# Patient Record
Sex: MALE | Race: WHITE | NOT HISPANIC OR LATINO | URBAN - METROPOLITAN AREA
[De-identification: names, ages, dates, MRNs, and addresses within clinical notes are randomized per-mention and may not be internally consistent; named-entity substitution may affect disease eponyms.]

---

## 2024-02-16 ENCOUNTER — EMERGENCY (EMERGENCY)
Facility: HOSPITAL | Age: 29
LOS: 1 days | Discharge: HOME | End: 2024-02-16
Attending: EMERGENCY MEDICINE | Admitting: EMERGENCY MEDICINE
Payer: COMMERCIAL

## 2024-02-16 VITALS
SYSTOLIC BLOOD PRESSURE: 162 MMHG | HEART RATE: 78 BPM | OXYGEN SATURATION: 100 % | DIASTOLIC BLOOD PRESSURE: 103 MMHG | RESPIRATION RATE: 18 BRPM | TEMPERATURE: 98 F

## 2024-02-16 VITALS
OXYGEN SATURATION: 99 % | TEMPERATURE: 98 F | SYSTOLIC BLOOD PRESSURE: 102 MMHG | DIASTOLIC BLOOD PRESSURE: 65 MMHG | RESPIRATION RATE: 18 BRPM | HEART RATE: 70 BPM

## 2024-02-16 DIAGNOSIS — R10.9 UNSPECIFIED ABDOMINAL PAIN: ICD-10-CM

## 2024-02-16 DIAGNOSIS — R39.15 URGENCY OF URINATION: ICD-10-CM

## 2024-02-16 DIAGNOSIS — R14.0 ABDOMINAL DISTENSION (GASEOUS): ICD-10-CM

## 2024-02-16 DIAGNOSIS — R11.0 NAUSEA: ICD-10-CM

## 2024-02-16 DIAGNOSIS — R35.0 FREQUENCY OF MICTURITION: ICD-10-CM

## 2024-02-16 LAB
ALBUMIN SERPL ELPH-MCNC: 4.7 G/DL — SIGNIFICANT CHANGE UP (ref 3.3–5)
ALP SERPL-CCNC: 63 U/L — SIGNIFICANT CHANGE UP (ref 40–120)
ALT FLD-CCNC: 55 U/L — HIGH (ref 10–45)
ANION GAP SERPL CALC-SCNC: 9 MMOL/L — SIGNIFICANT CHANGE UP (ref 5–17)
APPEARANCE UR: CLEAR — SIGNIFICANT CHANGE UP
AST SERPL-CCNC: 51 U/L — HIGH (ref 10–40)
BASOPHILS # BLD AUTO: 0.04 K/UL — SIGNIFICANT CHANGE UP (ref 0–0.2)
BASOPHILS NFR BLD AUTO: 0.5 % — SIGNIFICANT CHANGE UP (ref 0–2)
BILIRUB SERPL-MCNC: 0.4 MG/DL — SIGNIFICANT CHANGE UP (ref 0.2–1.2)
BILIRUB UR-MCNC: NEGATIVE — SIGNIFICANT CHANGE UP
BUN SERPL-MCNC: 17 MG/DL — SIGNIFICANT CHANGE UP (ref 7–23)
CALCIUM SERPL-MCNC: 9.9 MG/DL — SIGNIFICANT CHANGE UP (ref 8.4–10.5)
CHLORIDE SERPL-SCNC: 103 MMOL/L — SIGNIFICANT CHANGE UP (ref 96–108)
CO2 SERPL-SCNC: 25 MMOL/L — SIGNIFICANT CHANGE UP (ref 22–31)
COLOR SPEC: YELLOW — SIGNIFICANT CHANGE UP
CREAT SERPL-MCNC: 0.94 MG/DL — SIGNIFICANT CHANGE UP (ref 0.5–1.3)
DIFF PNL FLD: NEGATIVE — SIGNIFICANT CHANGE UP
EGFR: 115 ML/MIN/1.73M2 — SIGNIFICANT CHANGE UP
EOSINOPHIL # BLD AUTO: 0.09 K/UL — SIGNIFICANT CHANGE UP (ref 0–0.5)
EOSINOPHIL NFR BLD AUTO: 1.2 % — SIGNIFICANT CHANGE UP (ref 0–6)
GLUCOSE SERPL-MCNC: 110 MG/DL — HIGH (ref 70–99)
GLUCOSE UR QL: NEGATIVE MG/DL — SIGNIFICANT CHANGE UP
HCT VFR BLD CALC: 44.5 % — SIGNIFICANT CHANGE UP (ref 39–50)
HGB BLD-MCNC: 15.2 G/DL — SIGNIFICANT CHANGE UP (ref 13–17)
IMM GRANULOCYTES NFR BLD AUTO: 0.3 % — SIGNIFICANT CHANGE UP (ref 0–0.9)
KETONES UR-MCNC: NEGATIVE MG/DL — SIGNIFICANT CHANGE UP
LEUKOCYTE ESTERASE UR-ACNC: NEGATIVE — SIGNIFICANT CHANGE UP
LIDOCAIN IGE QN: 54 U/L — SIGNIFICANT CHANGE UP (ref 7–60)
LYMPHOCYTES # BLD AUTO: 2.67 K/UL — SIGNIFICANT CHANGE UP (ref 1–3.3)
LYMPHOCYTES # BLD AUTO: 35.5 % — SIGNIFICANT CHANGE UP (ref 13–44)
MCHC RBC-ENTMCNC: 30.3 PG — SIGNIFICANT CHANGE UP (ref 27–34)
MCHC RBC-ENTMCNC: 34.2 GM/DL — SIGNIFICANT CHANGE UP (ref 32–36)
MCV RBC AUTO: 88.6 FL — SIGNIFICANT CHANGE UP (ref 80–100)
MONOCYTES # BLD AUTO: 0.83 K/UL — SIGNIFICANT CHANGE UP (ref 0–0.9)
MONOCYTES NFR BLD AUTO: 11 % — SIGNIFICANT CHANGE UP (ref 2–14)
NEUTROPHILS # BLD AUTO: 3.88 K/UL — SIGNIFICANT CHANGE UP (ref 1.8–7.4)
NEUTROPHILS NFR BLD AUTO: 51.5 % — SIGNIFICANT CHANGE UP (ref 43–77)
NITRITE UR-MCNC: NEGATIVE — SIGNIFICANT CHANGE UP
NRBC # BLD: 0 /100 WBCS — SIGNIFICANT CHANGE UP (ref 0–0)
PH UR: 6.5 — SIGNIFICANT CHANGE UP (ref 5–8)
PLATELET # BLD AUTO: 230 K/UL — SIGNIFICANT CHANGE UP (ref 150–400)
POTASSIUM SERPL-MCNC: 3.8 MMOL/L — SIGNIFICANT CHANGE UP (ref 3.5–5.3)
POTASSIUM SERPL-SCNC: 3.8 MMOL/L — SIGNIFICANT CHANGE UP (ref 3.5–5.3)
PROT SERPL-MCNC: 7.2 G/DL — SIGNIFICANT CHANGE UP (ref 6–8.3)
PROT UR-MCNC: NEGATIVE MG/DL — SIGNIFICANT CHANGE UP
RBC # BLD: 5.02 M/UL — SIGNIFICANT CHANGE UP (ref 4.2–5.8)
RBC # FLD: 12.5 % — SIGNIFICANT CHANGE UP (ref 10.3–14.5)
SODIUM SERPL-SCNC: 137 MMOL/L — SIGNIFICANT CHANGE UP (ref 135–145)
SP GR SPEC: 1.03 — SIGNIFICANT CHANGE UP (ref 1–1.03)
UROBILINOGEN FLD QL: 1 MG/DL — SIGNIFICANT CHANGE UP (ref 0.2–1)
WBC # BLD: 7.53 K/UL — SIGNIFICANT CHANGE UP (ref 3.8–10.5)
WBC # FLD AUTO: 7.53 K/UL — SIGNIFICANT CHANGE UP (ref 3.8–10.5)

## 2024-02-16 PROCEDURE — 85025 COMPLETE CBC W/AUTO DIFF WBC: CPT

## 2024-02-16 PROCEDURE — 74176 CT ABD & PELVIS W/O CONTRAST: CPT | Mod: 26,MG

## 2024-02-16 PROCEDURE — G1004: CPT

## 2024-02-16 PROCEDURE — 96375 TX/PRO/DX INJ NEW DRUG ADDON: CPT

## 2024-02-16 PROCEDURE — 83690 ASSAY OF LIPASE: CPT

## 2024-02-16 PROCEDURE — 99284 EMERGENCY DEPT VISIT MOD MDM: CPT | Mod: 25

## 2024-02-16 PROCEDURE — 99284 EMERGENCY DEPT VISIT MOD MDM: CPT

## 2024-02-16 PROCEDURE — 36415 COLL VENOUS BLD VENIPUNCTURE: CPT

## 2024-02-16 PROCEDURE — 96374 THER/PROPH/DIAG INJ IV PUSH: CPT

## 2024-02-16 PROCEDURE — 74176 CT ABD & PELVIS W/O CONTRAST: CPT | Mod: MG

## 2024-02-16 PROCEDURE — 81003 URINALYSIS AUTO W/O SCOPE: CPT

## 2024-02-16 PROCEDURE — 76705 ECHO EXAM OF ABDOMEN: CPT | Mod: 26

## 2024-02-16 PROCEDURE — 80053 COMPREHEN METABOLIC PANEL: CPT

## 2024-02-16 PROCEDURE — 76705 ECHO EXAM OF ABDOMEN: CPT

## 2024-02-16 RX ORDER — ACETAMINOPHEN 500 MG
1000 TABLET ORAL ONCE
Refills: 0 | Status: COMPLETED | OUTPATIENT
Start: 2024-02-16 | End: 2024-02-16

## 2024-02-16 RX ORDER — ONDANSETRON 8 MG/1
4 TABLET, FILM COATED ORAL ONCE
Refills: 0 | Status: COMPLETED | OUTPATIENT
Start: 2024-02-16 | End: 2024-02-16

## 2024-02-16 RX ORDER — FAMOTIDINE 10 MG/ML
20 INJECTION INTRAVENOUS ONCE
Refills: 0 | Status: COMPLETED | OUTPATIENT
Start: 2024-02-16 | End: 2024-02-16

## 2024-02-16 RX ORDER — SODIUM CHLORIDE 9 MG/ML
1000 INJECTION INTRAMUSCULAR; INTRAVENOUS; SUBCUTANEOUS ONCE
Refills: 0 | Status: COMPLETED | OUTPATIENT
Start: 2024-02-16 | End: 2024-02-16

## 2024-02-16 RX ADMIN — ONDANSETRON 4 MILLIGRAM(S): 8 TABLET, FILM COATED ORAL at 03:53

## 2024-02-16 RX ADMIN — Medication 400 MILLIGRAM(S): at 03:53

## 2024-02-16 RX ADMIN — FAMOTIDINE 20 MILLIGRAM(S): 10 INJECTION INTRAVENOUS at 03:54

## 2024-02-16 RX ADMIN — SODIUM CHLORIDE 1000 MILLILITER(S): 9 INJECTION INTRAMUSCULAR; INTRAVENOUS; SUBCUTANEOUS at 03:53

## 2024-02-16 NOTE — ED PROVIDER NOTE - CARE PROVIDERS DIRECT ADDRESSES
[email or web address]

## 2024-02-16 NOTE — ED PROVIDER NOTE - PHYSICAL EXAMINATION
Constitutional: awake and alert, in no acute distress  HEENT: head normocephalic and atraumatic. moist mucous membranes  Eyes: extraocular movements intact, normal conjunctiva  Neck: supple, normal ROM  Cardiovascular: regular rate   Pulmonary: no respiratory distress  Gastrointestinal: abdomen soft, nondistended, diffuse tenderness to lower abdomen and b/l flank, no g/r  Skin: warm, dry, normal for ethnicity  Musculoskeletal: no edema, no deformity, NROM  Neurological: oriented x4, no focal neurologic deficit.   Psychiatric: calm and cooperative, no SI/HI

## 2024-02-16 NOTE — ED PROVIDER NOTE - OBJECTIVE STATEMENT
27 yo male w/o known medical h/o present to Er c/o sudden onset of intense abdominal pain that started tonight. Pt reports pain is associated with abdominal bloating and nausea and radiaites to his flank area. Pt also mentioned he has been urinating more frequently tonight and noted that only small amount of urine is coming out. Pt denies any hematuria, denies h/o kidney stones in the past, denies penile discharge, irritations, unprotected intercourse, testicular pain.

## 2024-02-16 NOTE — ED PROVIDER NOTE - ATTENDING APP SHARED VISIT CONTRIBUTION OF CARE
28M no PMH c/o abd pain tonight. pt states started suddenly, states felt nauseated. initially around umbilicus but then had b/l flank pain. no diarrhea. no fever, no vomiting. no recent travel. no sick contacts.   gen- nad  heent- ncat  abd - soft, nt, nd  ext -wwp  neuro -aox3, steady gait, shirley  no guarding, no rebound, CT concern for possible cholecystitis. pending US. pt feeling better after ivf, tylneol, pepcid, zofran.

## 2024-02-16 NOTE — ED PROVIDER NOTE - NSFOLLOWUPINSTRUCTIONS_ED_ALL_ED_FT
Thank you for visiting [facility] Emergency Department.      Please know that no emergency visit is complete without follow-up with your primary care provider in 1 week.  General surgery consult and re-evaluation recommended regarding your CT scan findings. Please bring copies of all discharge papers and results and show to your doctor.      I appreciated your patience and hope you feel better soon.   Return to ER immediately if you develop fevers, chills, chest pain, shortness of breath, worsening dizziness, and/or any concerning symptoms.    Abdominal Pain, Adult  Pain in the abdomen (abdominal pain) can be caused by many things. Often, abdominal pain is not serious and it gets better with no treatment or by being treated at home. However, sometimes abdominal pain is serious.    Your health care provider will ask questions about your medical history and do a physical exam to try to determine the cause of your abdominal pain.    Follow these instructions at home:  Medicines    Take over-the-counter and prescription medicines only as told by your health care provider.  Do not take a laxative unless told by your health care provider.  General instructions      Watch your condition for any changes.  Drink enough fluid to keep your urine pale yellow.  Keep all follow-up visits as told by your health care provider. This is important.  Contact a health care provider if:  Your abdominal pain changes or gets worse.  You are not hungry or you lose weight without trying.  You are constipated or have diarrhea for more than 2–3 days.  You have pain when you urinate or have a bowel movement.  Your abdominal pain wakes you up at night.  Your pain gets worse with meals, after eating, or with certain foods.  You are vomiting and cannot keep anything down.  You have a fever.  You have blood in your urine.  Get help right away if:  Your pain does not go away as soon as your health care provider told you to expect.  You cannot stop vomiting.  Your pain is only in areas of the abdomen, such as the right side or the left lower portion of the abdomen. Pain on the right side could be caused by appendicitis.  You have bloody or black stools, or stools that look like tar.  You have severe pain, cramping, or bloating in your abdomen.  You have signs of dehydration, such as:  Dark urine, very little urine, or no urine.  Cracked lips.  Dry mouth.  Sunken eyes.  Sleepiness.  Weakness.  You have trouble breathing or chest pain.  Summary  Often, abdominal pain is not serious and it gets better with no treatment or by being treated at home. However, sometimes abdominal pain is serious.  Watch your condition for any changes.  Take over-the-counter and prescription medicines only as told by your health care provider.  Contact a health care provider if your abdominal pain changes or gets worse.  Get help right away if you have severe pain, cramping, or bloating in your abdomen.  This information is not intended to replace advice given to you by your health care provider. Make sure you discuss any questions you have with your health care provider.

## 2024-02-16 NOTE — ED PROVIDER NOTE - PROGRESS NOTE DETAILS
Results of labs, CT and US discussed with pt. He feels much better and states his pain is completely resolved. Unlikely acute cholecystitis. No f/c, no leukocytosis, normal RUQ US. pt is stable for d/c, out pt f/u with a surgeon recommended.

## 2024-02-16 NOTE — ED ADULT TRIAGE NOTE - ARRIVAL INFO ADDITIONAL COMMENTS
pt c/o belching then periumbilical pain with nausea (no vomiting) at 11pm that then moved to bilateral flank pain

## 2024-02-16 NOTE — ED ADULT NURSE NOTE - OBJECTIVE STATEMENT
Pt aaox4, pt c/o epigastric abdominal pain since 11pm radiating to bilateral flank areas. Pt reports nausea, no emesis. Pt denies hematuria, dysuria, changes in BM, dizziness, ha, cp, sob.

## 2024-02-16 NOTE — ED PROVIDER NOTE - CLINICAL SUMMARY MEDICAL DECISION MAKING FREE TEXT BOX
27 yo male generally healthy in the ER with sudden onset of diffuse abdominal pain, abdominal bloating, flank pain and nausea. pt also mentioned urinary urgency and frequency. No known h/o kidney stones, no hematuria. pt reports pain was so intense that felt near fainting from the intensity. Afebrile and non-toxic appearing in the ER. No g/r, soft abd. mild CVAT. plan : labs, IVF, IV antiemetics, CT a/p to r/o kidney stones.

## 2024-02-16 NOTE — ED PROVIDER NOTE - CARE PROVIDER_API CALL
[facility]  Surgery  [address]  [place] [address]  Phone: [phone]  Fax: [phone]  Follow Up Time:     [facility]  Surgery  [address], [address]  [place] [address]  Phone: [phone]  Fax: [phone]  Follow Up Time:

## 2024-02-16 NOTE — ED PROVIDER NOTE - PATIENT PORTAL LINK FT
You can access the FollowMyHealth Patient Portal offered by [facility] by registering at the following website: [email or web address]. By joining [employer]’s FollowMyHealth portal, you will also be able to view your health information using other applications (apps) compatible with our system.

## 2024-05-02 NOTE — ED ADULT NURSE NOTE - CINV DISCH MEDS REVIEWED YN
Render Post-Care Instructions In Note?: no
Detail Level: Detailed
Duration Of Freeze Thaw-Cycle (Seconds): 0
Post-Care Instructions: I reviewed with the patient in detail post-care instructions. Patient is to wear sunprotection, and avoid picking at any of the treated lesions. Pt may apply Vaseline to crusted or scabbing areas.
Show Applicator Variable?: Yes
Consent: The patient's consent was obtained including but not limited to risks of crusting, scabbing, blistering, scarring, darker or lighter pigmentary change, recurrence, incomplete removal and infection.
Yes

## 2024-09-20 VITALS
TEMPERATURE: 97 F | SYSTOLIC BLOOD PRESSURE: 153 MMHG | OXYGEN SATURATION: 100 % | RESPIRATION RATE: 18 BRPM | HEIGHT: 74 IN | WEIGHT: 205.03 LBS | HEART RATE: 61 BPM | DIASTOLIC BLOOD PRESSURE: 102 MMHG

## 2024-09-20 LAB — ADD ON TEST-SPECIMEN IN LAB: SIGNIFICANT CHANGE UP

## 2024-09-20 PROCEDURE — 99285 EMERGENCY DEPT VISIT HI MDM: CPT

## 2024-09-20 RX ADMIN — Medication 4 MILLIGRAM(S): at 23:01

## 2024-09-20 NOTE — ED ADULT TRIAGE NOTE - CHIEF COMPLAINT QUOTE
pt dx with gallstones last month and met with surgeon who suggested surgery.   1 hour ago pt developed severe ruq pain and n/v after eating.

## 2024-09-20 NOTE — ED ADULT NURSE NOTE - OBJECTIVE STATEMENT
Pt complaints of RUQ abdominal pain w/ associated N/V after eating today, pta 1 hour ago. Report dx w/ gallstones last month.  Pt is alert and oriented, A&O4, steady gait noted.

## 2024-09-21 ENCOUNTER — OUTPATIENT (OUTPATIENT)
Dept: EMERGENCY DEPT | Facility: HOSPITAL | Age: 29
LOS: 1 days | Discharge: HOME | End: 2024-09-21
Payer: COMMERCIAL

## 2024-09-21 VITALS
OXYGEN SATURATION: 97 % | DIASTOLIC BLOOD PRESSURE: 71 MMHG | SYSTOLIC BLOOD PRESSURE: 112 MMHG | HEART RATE: 59 BPM | TEMPERATURE: 98 F | RESPIRATION RATE: 17 BRPM

## 2024-09-21 LAB
APPEARANCE UR: CLEAR — SIGNIFICANT CHANGE UP
APTT BLD: 33.8 SEC — SIGNIFICANT CHANGE UP (ref 24.5–35.6)
BACTERIA # UR AUTO: NEGATIVE /HPF — SIGNIFICANT CHANGE UP
BILIRUB UR-MCNC: NEGATIVE — SIGNIFICANT CHANGE UP
BLD GP AB SCN SERPL QL: NEGATIVE — SIGNIFICANT CHANGE UP
COLOR SPEC: YELLOW — SIGNIFICANT CHANGE UP
DIFF PNL FLD: NEGATIVE — SIGNIFICANT CHANGE UP
GLUCOSE UR QL: NEGATIVE MG/DL — SIGNIFICANT CHANGE UP
INR BLD: 1.24 — HIGH (ref 0.85–1.18)
KETONES UR-MCNC: 40 MG/DL
LEUKOCYTE ESTERASE UR-ACNC: ABNORMAL
NITRITE UR-MCNC: NEGATIVE — SIGNIFICANT CHANGE UP
PH UR: 6 — SIGNIFICANT CHANGE UP (ref 5–8)
PROT UR-MCNC: NEGATIVE MG/DL — SIGNIFICANT CHANGE UP
PROTHROM AB SERPL-ACNC: 14 SEC — HIGH (ref 9.5–13)
RBC CASTS # UR COMP ASSIST: 1 /HPF — SIGNIFICANT CHANGE UP (ref 0–4)
RH IG SCN BLD-IMP: POSITIVE — SIGNIFICANT CHANGE UP
SP GR SPEC: >1.03 — HIGH (ref 1–1.03)
UROBILINOGEN FLD QL: 1 MG/DL — SIGNIFICANT CHANGE UP (ref 0.2–1)
WBC UR QL: 1 /HPF — SIGNIFICANT CHANGE UP (ref 0–5)

## 2024-09-21 PROCEDURE — 85610 PROTHROMBIN TIME: CPT

## 2024-09-21 PROCEDURE — 88304 TISSUE EXAM BY PATHOLOGIST: CPT | Mod: 26

## 2024-09-21 PROCEDURE — 76705 ECHO EXAM OF ABDOMEN: CPT

## 2024-09-21 PROCEDURE — 80053 COMPREHEN METABOLIC PANEL: CPT

## 2024-09-21 PROCEDURE — 76705 ECHO EXAM OF ABDOMEN: CPT | Mod: 26

## 2024-09-21 PROCEDURE — 47562 LAPAROSCOPIC CHOLECYSTECTOMY: CPT

## 2024-09-21 PROCEDURE — 86900 BLOOD TYPING SEROLOGIC ABO: CPT

## 2024-09-21 PROCEDURE — 85025 COMPLETE CBC W/AUTO DIFF WBC: CPT

## 2024-09-21 PROCEDURE — 81001 URINALYSIS AUTO W/SCOPE: CPT

## 2024-09-21 PROCEDURE — 83690 ASSAY OF LIPASE: CPT

## 2024-09-21 PROCEDURE — 85730 THROMBOPLASTIN TIME PARTIAL: CPT

## 2024-09-21 PROCEDURE — 88304 TISSUE EXAM BY PATHOLOGIST: CPT

## 2024-09-21 PROCEDURE — 86850 RBC ANTIBODY SCREEN: CPT

## 2024-09-21 PROCEDURE — 36415 COLL VENOUS BLD VENIPUNCTURE: CPT

## 2024-09-21 PROCEDURE — 86901 BLOOD TYPING SEROLOGIC RH(D): CPT

## 2024-09-21 DEVICE — LIGATING CLIPS WECK HEMOLOK POLYMER MEDIUM-LARGE (GREEN) 6: Type: IMPLANTABLE DEVICE | Status: FUNCTIONAL

## 2024-09-21 RX ORDER — ONDANSETRON 2 MG/ML
4 INJECTION, SOLUTION INTRAMUSCULAR; INTRAVENOUS EVERY 6 HOURS
Refills: 0 | Status: DISCONTINUED | OUTPATIENT
Start: 2024-09-21 | End: 2024-09-21

## 2024-09-21 RX ORDER — HYDROMORPHONE HYDROCHLORIDE 2 MG/1
0.25 TABLET ORAL EVERY 6 HOURS
Refills: 0 | Status: DISCONTINUED | OUTPATIENT
Start: 2024-09-21 | End: 2024-09-21

## 2024-09-21 RX ORDER — PANTOPRAZOLE SODIUM 40 MG
40 TABLET, DELAYED RELEASE (ENTERIC COATED) ORAL DAILY
Refills: 0 | Status: DISCONTINUED | OUTPATIENT
Start: 2024-09-21 | End: 2024-09-21

## 2024-09-21 RX ORDER — DOCUSATE CALCIUM 240 MG/1
1 CAPSULE ORAL
Qty: 10 | Refills: 0
Start: 2024-09-21 | End: 2024-09-25

## 2024-09-21 RX ORDER — FLU VACCINE TS 2012-2013(5YR+) 45MCG/.5ML
0.5 VIAL (ML) INTRAMUSCULAR ONCE
Refills: 0 | Status: DISCONTINUED | OUTPATIENT
Start: 2024-09-21 | End: 2024-09-21

## 2024-09-21 RX ORDER — HYDROMORPHONE HYDROCHLORIDE 2 MG/1
0.5 TABLET ORAL ONCE
Refills: 0 | Status: DISCONTINUED | OUTPATIENT
Start: 2024-09-21 | End: 2024-09-21

## 2024-09-21 RX ORDER — OXYCODONE HYDROCHLORIDE 5 MG/1
1 TABLET ORAL
Qty: 12 | Refills: 0
Start: 2024-09-21 | End: 2024-09-23

## 2024-09-21 RX ORDER — METRONIDAZOLE 250 MG
500 TABLET ORAL EVERY 8 HOURS
Refills: 0 | Status: DISCONTINUED | OUTPATIENT
Start: 2024-09-21 | End: 2024-09-21

## 2024-09-21 RX ORDER — ACETAMINOPHEN 325 MG/1
1000 TABLET ORAL ONCE
Refills: 0 | Status: COMPLETED | OUTPATIENT
Start: 2024-09-21 | End: 2024-09-21

## 2024-09-21 RX ORDER — OXYCODONE HYDROCHLORIDE 5 MG/1
5 TABLET ORAL EVERY 8 HOURS
Refills: 0 | Status: DISCONTINUED | OUTPATIENT
Start: 2024-09-21 | End: 2024-09-21

## 2024-09-21 RX ORDER — OXYCODONE HYDROCHLORIDE 5 MG/1
2.5 TABLET ORAL EVERY 8 HOURS
Refills: 0 | Status: DISCONTINUED | OUTPATIENT
Start: 2024-09-21 | End: 2024-09-21

## 2024-09-21 RX ORDER — ACETAMINOPHEN 325 MG/1
650 TABLET ORAL EVERY 6 HOURS
Refills: 0 | Status: DISCONTINUED | OUTPATIENT
Start: 2024-09-21 | End: 2024-09-21

## 2024-09-21 RX ORDER — KETOROLAC TROMETHAMINE 30 MG/ML
15 INJECTION, SOLUTION INTRAMUSCULAR EVERY 8 HOURS
Refills: 0 | Status: DISCONTINUED | OUTPATIENT
Start: 2024-09-21 | End: 2024-09-21

## 2024-09-21 RX ORDER — HYDROMORPHONE HYDROCHLORIDE 2 MG/1
0.5 TABLET ORAL EVERY 6 HOURS
Refills: 0 | Status: DISCONTINUED | OUTPATIENT
Start: 2024-09-21 | End: 2024-09-21

## 2024-09-21 RX ADMIN — OXYCODONE HYDROCHLORIDE 5 MILLIGRAM(S): 5 TABLET ORAL at 12:16

## 2024-09-21 RX ADMIN — Medication 100 MILLIGRAM(S): at 06:15

## 2024-09-21 RX ADMIN — KETOROLAC TROMETHAMINE 15 MILLIGRAM(S): 30 INJECTION, SOLUTION INTRAMUSCULAR at 10:57

## 2024-09-21 RX ADMIN — Medication 4 MILLIGRAM(S): at 02:47

## 2024-09-21 RX ADMIN — ACETAMINOPHEN 400 MILLIGRAM(S): 325 TABLET ORAL at 00:55

## 2024-09-21 RX ADMIN — HYDROMORPHONE HYDROCHLORIDE 0.5 MILLIGRAM(S): 2 TABLET ORAL at 10:56

## 2024-09-21 RX ADMIN — Medication 120 MILLILITER(S): at 06:15

## 2024-09-21 RX ADMIN — KETOROLAC TROMETHAMINE 15 MILLIGRAM(S): 30 INJECTION, SOLUTION INTRAMUSCULAR at 11:40

## 2024-09-21 RX ADMIN — OXYCODONE HYDROCHLORIDE 5 MILLIGRAM(S): 5 TABLET ORAL at 11:16

## 2024-09-21 RX ADMIN — Medication 100 MILLIGRAM(S): at 05:23

## 2024-09-21 RX ADMIN — HYDROMORPHONE HYDROCHLORIDE 0.5 MILLIGRAM(S): 2 TABLET ORAL at 11:40

## 2024-09-21 NOTE — DISCHARGE NOTE PROVIDER - HOSPITAL COURSE
27yo M w/ PMH GERD/gastritis s/p EGD (08/2024, Dr. Gandhi) and no PSH presented w/ 5hr hx of post-prandial RUQ pain. Pt endorses that 6hrs prior to presentation he had a steak dinner and roughly 1hr later he began having constant RUQ pain w/ ttp. Pt endorses that since February 2024 he has had roughly 25 epidoes of similar pain of varying duration, longest was 8hrs of constant pain w/ resolution. Pt endorses that in August 2024 he had extensive w/u completed including MRCP which he reports stated he had cholelithiasis w/ GB wall thickening of 1.1cm and a HIDA scan w/ no GB visualized performed at [facility]. In the ED, vitals afebrile, normotensive, nontachycardic Pt received Labs sx for lipase 75, RUQ-US revealing common bile duct measuring 3 mm with Cholelithiasis. No focal tenderness or evidence of cholecystitis. Received ceftriaxone and flagyl. Now s/p laparoscopic cholecystectomy. Patient's post-operative course was uncomplicated. Diet was advanced as tolerated and pain was well controlled on medication. On day of discharge, pt deemed stable and ready to return home with plan to follow up as an outpatient.

## 2024-09-21 NOTE — PRE-ANESTHESIA EVALUATION ADULT - NSANTHPMHFT_GEN_ALL_CORE
27yo M w/ PMH GERD/gastritis s/p EGD (08/2024, Dr. Gandhi) and no PSH presenting w/ 5hr hx of post-prandial RUQ pain. Pt endorses that 6hrs prior to presentation he had a steak dinner and roughly 1hr later he began having constant RUQ pain w/ ttp. Pt denies nausea/vomiting. Denies fever/chills. Pt endorses that since February 2024 he has had roughly 25 epidoes of similar pain of varying duration, longest was 8hrs of constant pain w/ resolution. Pt endorses that in August 2024 he had extensive w/u completed including MRCP which he reports stated he had cholelithiasis w/ GB wall thickening of 1.1cm and a HIDA scan w/ no GB visualized performed at [facility]. Pt endorses that he saw Dr. Swift in clinic today for discussion of surgical plan for cholecystectomy.

## 2024-09-21 NOTE — DISCHARGE NOTE PROVIDER - NSDCFUADDINST_GEN_ALL_CORE_FT
General Discharge Instructions:  Please resume all regular home medications unless specifically advised not to take a particular medication. Also, please take any new medications as prescribed.  Please get plenty of rest, continue to ambulate several times per day, and drink adequate amounts of fluids. Avoid lifting weights greater than 5-10 lbs until you follow-up with your surgeon, who will instruct you further regarding activity restrictions.  Avoid driving or operating heavy machinery while taking pain medications.  Please follow-up with your surgeon and Primary Care Provider (PCP) as advised.  Incision Care:  *Please call your doctor or nurse practitioner if you have increased pain, swelling, redness, or drainage from the incision site.  *Avoid swimming and baths until your follow-up appointment.  *You may shower, and wash surgical incisions with a mild soap and warm water. Gently pat the area dry.  *If you have steri-strips, they will fall off on their own. Please remove any remaining strips 7-10 days after surgery.     Warning Signs:  Please call your doctor or nurse practitioner if you experience the following:  *You experience new chest pain, pressure, squeezing or tightness.  *New or worsening cough, shortness of breath, or wheeze.  *If you are vomiting and cannot keep down fluids or your medications.  *You are getting dehydrated due to continued vomiting, diarrhea, or other reasons. Signs of dehydration include dry mouth, rapid heartbeat, or feeling dizzy or faint when standing.  *You see blood or dark/black material when you vomit or have a bowel movement.  *You experience burning when you urinate, have blood in your urine, or experience a discharge.  *Your pain is not improving within 8-12 hours or is not gone within 24 hours. Call or return immediately if your pain is getting worse, changes location, or moves to your chest or back.  *You have shaking chills, or fever greater than 101.5 degrees Fahrenheit or 38 degrees Celsius.  *Any change in your symptoms, or any new symptoms that concern you.

## 2024-09-21 NOTE — H&P ADULT - NSHPPHYSICALEXAM_GEN_ALL_CORE
GENERAL: NAD, lying in bed comfortably  HEAD:  Atraumatic, normocephalic  EYES: EOMI, PERRLA, conjunctiva and sclera clear  NECK: Supple, trachea midline, no JVD  HEART: Regular rate and rhythm, no murmurs, rubs, or gallops  LUNGS: Unlabored respirations.  Clear to auscultation bilaterally, no crackles, wheezing, or rhonchi  ABDOMEN: soft, nondistended, mildly ttp in RUQ w/ (-)Perea's sign  EXTREMITIES: 2+ peripheral pulses bilaterally. No clubbing, cyanosis, or edema  NERVOUS SYSTEM:  A&Ox3, moving all extremities, no focal deficits   SKIN: No rashes or lesions

## 2024-09-21 NOTE — ED PROVIDER NOTE - OBJECTIVE STATEMENT
29 y/o m hx cholelithiasis presents c/o RUQ abd pain which came on tonight after eating.  Pt stating pain has been sharp, severe since onset.  Pt met with a surgeon Dr. Swift earlier today regarding his biliary colic and reports plan is for outpatient surgery but then had worsening pain tonight so came to ED.  Denies fever, chills, vomiting, diarrhea, dysuria, all other ROS negative.
Statement Selected

## 2024-09-21 NOTE — ED PROVIDER NOTE - NS ED MD DISPO ISOLATION TYPES
Paperwork received, filled out and signed by provider. Sent to PA team to be sent out for authorization.   
Pharmacy faxed in a request for prior authorization on:    Medication: Wegovy 0.25mg   Dosage: Inject 0.25 mg into the skin every 7 days for 4 doses.  Quantity requested:  2mL with 0 refills   Pharmacy for prescription has been selected.    Prior authorization request has been initiated and sent for completion.    
What is the status of this PA??   
None

## 2024-09-21 NOTE — DISCHARGE NOTE PROVIDER - NSDCMRMEDTOKEN_GEN_ALL_CORE_FT
Colace 100 mg oral capsule: 1 cap(s) orally 2 times a day as needed for  constipation Please take if you are taking oxycodone to relieve constipation  oxyCODONE 5 mg oral tablet: 1 tab(s) orally every 6 hours as needed for  severe pain MDD: 20mg

## 2024-09-21 NOTE — DISCHARGE NOTE NURSING/CASE MANAGEMENT/SOCIAL WORK - PATIENT PORTAL LINK FT
You can access the FollowMyHealth Patient Portal offered by [facility] by registering at the following website: [email or web address]. By joining [employer]’s FollowMyHealth portal, you will also be able to view your health information using other applications (apps) compatible with our system.

## 2024-09-21 NOTE — DISCHARGE NOTE PROVIDER - NSDCFUADDAPPT_GEN_ALL_CORE_FT
Please follow up with Dr. Swift in one week; you may call the office to make an appointment at your earliest convenience.

## 2024-09-21 NOTE — H&P ADULT - NSHPLABSRESULTS_GEN_ALL_CORE
ACC: [id number] EXAM:  US ABDOMEN RT UPR QUADRANT   ORDERED BY: NEVILLE GUZMAN     PROCEDURE DATE:  09/21/2024          INTERPRETATION:  CLINICAL INFORMATION: RUQ pain, cholelithiasis    COMPARISON: Abdominal sonogram dated 2/16/2024. CT of the abdomen and   pelvis dated 2/16/2024.    TECHNIQUE: Sonography of the right upper quadrant.    FINDINGS:  Liver: Increased echogenicity.  Bile ducts: Normal caliber. Common bile duct measures 3 mm.  Gallbladder: Cholelithiasis.  No focal tenderness or evidence of   cholecystitis.  Pancreas: Visualized portions are within normal limits.  Right kidney: 10.3 cm. No hydronephrosis.  Ascites: None.  IVC: Visualized portions are within normal limits.    IMPRESSION:  Cholelithiasis without evidence of cholecystitis.

## 2024-09-21 NOTE — DISCHARGE NOTE PROVIDER - CARE PROVIDER_API CALL
Nixon Swift [facility]  Surgery  [address] [place] [id number]  Phone: [phone]  Fax: [phone]  Follow Up Time: 1 week

## 2024-09-21 NOTE — H&P ADULT - HISTORY OF PRESENT ILLNESS
YAMILEX BLAIR  [id number]    HPI:   27yo M w/ PMH GERD/gastritis s/p EGD (08/2024, Dr. Gandhi) and no PSH presenting w/ 5hr hx of post-prandial RUQ pain. Pt endorses that 6hrs prior to presentation he had a steak dinner and roughly 1hr later he began having constant RUQ pain w/ ttp. Pt denies nausea/vomiting. Denies fever/chills. Pt endorses that since February 2024 he has had roughly 25 epidoes of similar pain of varying duration, longest was 8hrs of constant pain w/ resolution. Pt endorses that in August 2024 he had extensive w/u completed including MRCP which he reports stated he had cholelithiasis w/ GB wall thickening of 1.1cm and a HIDA scan w/ no GB visualized performed at [facility]. Pt endorses that he saw Dr. Swift in clinic today for discussion of surgical plan for cholecystectomy.     On exam, soft, nondistended, mildly ttp in RUQ w/ (-)Perea's sign    In the ED, vitals afebrile, normotensive, nontachycardic   Pt received   Labs sx for lipase 75,   RUQ-US sx for Common bile duct measures 3 mm. Cholelithiasis. No focal tenderness or evidence of cholecystitis.      PMH: GERD/gastritis   PSH: none  Meds: omeprazole 40 qd  FHx: no FHx of UC/Crohns, Mom s/p cholecystectomy, Aunt s/p cholecytestectomy, Sister s/p cholecystectomy, Aunt w/ hx of panc ca   Allergies: NKDA  C-scope: none  EGD: sx for GERD/gastritic (08/2024, Dr. Gandhi)

## 2024-09-21 NOTE — BRIEF OPERATIVE NOTE - OPERATION/FINDINGS
Infraumbilical cholo cutdown, 5mm trocar x3 placed under direct visualization. Pt placed in reverse Trendelenburg w/ right side up. Cystic duct and artery dissected free. Critical view of safety obtained. Cystic duct and artery clipped and divided. Peritoneal attachments btw GB & liver bed  w/ electrocautery. Hemostasis achieved. No leakage of bile from cystic duct stump.

## 2024-09-21 NOTE — H&P ADULT - ASSESSMENT
27yo M w/ PMH GERD/gastritis s/p EGD (08/2024, Dr. Gandhi) and no PSH presenting w/ 5hr hx of post-prandial RUQ pain. In the ED, pt afebrile, normotensive, nontachy. Labs all wnl. RUQ-US performed in ED demonstrating normal CBD caliber and cholelithiasis w/out evidence of acute cody. On exam, abd soft, mildly ttp in RUQ however pt had recently received pain medication, nondistended. Given pt hx of 25 episodes of similar pain w/ known hx of cholelithiasis, pt likely presenting w/ biliary colic vs early acute-on-chronic cody. Will admit for operative mgmt.     Admit-[facility], Dr. Swift   NPO/IVF  CTX/Flagyl   pain/nausea prn  SCDs  OR for lap cody

## 2024-09-21 NOTE — CHART NOTE - NSCHARTNOTEFT_GEN_A_CORE
General Surgery Post op Check    Pt seen and examined without complaints. Pain is controlled. Denies SOB/CP/N/V.     Vital Signs Last 24 Hrs  T(C): 36.3 (21 Sep 2024 12:00), Max: 36.7 (21 Sep 2024 02:15)  T(F): 97.4 (21 Sep 2024 12:00), Max: 98.1 (21 Sep 2024 02:15)  HR: 60 (21 Sep 2024 12:00) (47 - 61)  BP: 106/65 (21 Sep 2024 12:00) (105/57 - 153/102)  BP(mean): 77 (21 Sep 2024 11:25) (77 - 88)  RR: 17 (21 Sep 2024 12:00) (15 - 22)  SpO2: 97% (21 Sep 2024 12:00) (96% - 100%)    Parameters below as of 21 Sep 2024 12:00  Patient On (Oxygen Delivery Method): room air        I&O's Summary    21 Sep 2024 07:01  -  21 Sep 2024 14:26  --------------------------------------------------------  IN: 500 mL / OUT: 0 mL / NET: 500 mL        Physical Exam  Gen: NAD, A&Ox3  Pulm: No respiratory distress, no subcostal retractions  CV: RRR  Abd: Soft, ND, appropriate jose-incisional tenderness. Incisions c/d/i  Extremities:  FROM, warm and well perfused    A/P: 28y Male s/p lap cody. Stable for discharge home
Patient: YAMILEX BLAIR  YOB: 1995  Phone: [phone]   MRN: [id number] Acc: [id number]  Date of Exam: 08-     EXAM:  NUCLEAR MEDICINE BILIARY SCAN GALLBLADDER     HISTORY:  Right upper quadrant abdominal pain and abnormal weight loss.    TECHNIQUE:  8.0 millicuries of technetium 99 M labeled Choletec was administered intravenously into the left antecubital fossa. Then sequential scintigrams of the right upper quadrant were obtained to 60 minutes. 60 minute dep737 minute static images are also provided in multiple projections.       COMPARISON:  None    FINDINGS:  There is prompt and normal uptake of the radionuclide by the liver.    There is prompt and normal excretion to the common bile duct which is visualized by 7 minutes following radionuclide administration and to the small bowel which is visualized by 12 minutes following radionuclide administration.    The gallbladder is not visualized.    IMPRESSION:    1. Nonvisualization of the gallbladder. This is consistent with cystic duct obstruction and acute cholecystitis in the appropriate clinical setting.  2. No evidence of common bile duct obstruction.    Due to findings which may be representative of acute cholecystitis, the patient was provided with a copy of the report and sent to the nearest emergency room for further evaluation.    Thank you for the opportunity to participate in the care of this patient.    -----------------------------------------------------------------------------------------------    	  EXAM:  MRCP ABDOMEN WITHOUT CONTRAST    HISTORY:  Upper abdominal pain. Abnormal weight loss.    TECHNIQUE:  MRI of the abdomen performed.  Contrast Technique:  Without  Oral Contrast: None.  Acquisition:  A multiplanar multiecho liver MRI examination was performed utilizing a 1.5T magnet.   Subtraction views are created. Diffusion weighted imaging with multiple B values and ADC map imaging is also provided. MRCP imaging is obtained with both thick slab and thin slab acquisition technique.    COMPARISON:  Abdominal ultrasound April 27, 2024.    FINDINGS: VISUALIZED PORTION OF CHEST  LUNGS: No pleural effusions.  HEART: No abnormal signal.    FINDINGS: ABDOMEN    LIVER: Noncirrhotic morphology of the liver. Mild hepatic steatosis. Fat fraction 10%.    BILIARY: Gallbladder contracted. Gallbladder wall appears thickened, though this is nonspecific in the setting of a contracted gallbladder. There is also however suspected to be some gallbladder wall edema. Sludge versus a fundal stone measuring 1.1 cm. No ductal dilatation. No evident choledocholithiasis.    PANCREAS: No evident main pancreatic ductal dilatation.    SPLEEN: Upper limits measuring 12 x 12.8 x 3.7 cm.    ADRENAL GLANDS: Unremarkable.    KIDNEYS: No hydronephrosis.    PROXIMAL URETERS: No hydroureter.    STOMACH: Decompressed.    VISUALIZED PORTION OF BOWEL: Partially imaged bowel without evidence for obstruction.     PERITONEUM: No ascites.    LYMPH NODES: No definite pathologic adenopathy allowing for noncontrast limitations.    VASCULATURE: Normal caliber nonenhanced abdominal aorta.    SOFT TISSUES: No evident acute superficial soft tissue findings.    BONES: No evident destructive osseous process.    IMPRESSION:      1. Gallbladder wall thickening which is nonspecific in a contracted gallbladder. However there is also suspected gallbladder wall edema, findings may reflect chronic cholecystitis. Sludge versus a stone at the gallbladder fundus. Consider HIDA scan/surgical evaluation.  2. Mild hepatic steatosis.  3. Spleen borderline in size.    ----------------------------------------------------------------------------------------------------------------------------------------------     Patient: YAMILEX BLAIR  YOB: 1995  Phone: [phone]   MRN: [id number] Acc: [id number]  Date of Exam: 08-     EXAM: TRANSTHORACIC ECHOCARDIOGRAM    HISTORY: Weight loss/chest pain    BP: 118/76.  Weight: 215 lbs. Height: 74 inches.  Echocardiographer: GISSELL/AILEEN.    Previous Echocardiographic finding(s):    No prior studies available.    TECHNIQUE:  Real-time echocardiography was performed utilizing 2-D, M-Mode, color Doppler and tissue Doppler techniques. Global longitudinal strain was also performed.    M-MODE MEASUREMENTS:       Aortic Root Diameter              26   (Normal </= 37 mm)                         Aortic Valve Opening              9   (Normal 16-26 mm)  Left Atrium Dimension            26   (Normal </= 40 mm)  Right Ventricle Diastole (Normal </= 29 mm)  Left Ventricle Diastole            50   (Normal 35 - 56 mm)  Left Ventricle Systole              30   (Normal 21 - 40 mm)  IVS (Diastole)                          09   (Normal 7 - 11 mm)  LVPW (Diastole)                     10   (Normal 7 - 11 mm)  LVEF                                       50-55 (Normal >/= 55%)    DOPPLER:    AV Peak Velocity                 1.03        (Normal 1.0 - 1.7 m/sec)  AV Peak Gradient                ----   Aortic Valve Area                 ----       (Normal 2.1 - 3.5 cm2)  LVOT Diameter                   ----       (Normal 18 - 22 mm)  LVOT Peak Velocity            ----       (Normal 0.7 - 1.1 m/sec)  MV E Velocity                     0.62        (Normal 0.6 - 1.3 m/sec)  MV Area                              ----       (Normal 3.5 - 5.5 cm2)  TV E Velocity                      0.79       (Normal </= 2.7 m/sec)  TR Peak Velocity                ----       (Normal </= 2.0 m/sec)  RV Systolic Pressure          ----       (Normal < 30 mmHg)  PV Peak Velocity                0.90     FINDINGS:      Study Quality: Good.    Aortic Root: Within normal limits.    Aortic Valve: Normal without leaflet thickening, calcification, restricted motion, stenosis or insufficiency.    Mitral Valve: Normal without leaflet thickening, calcification, restricted motion, prolapse, stenosis or insufficiency.    Tricuspid Valve: Structurally normal without insufficiency.  The pulmonary artery peak systolic pressure is normal.    Pulmonic Valve: Structurally normal without insufficiency.    Left Ventricle: Normal chamber size and morphology without thickening or systolic wall motion abnormalities. Global and regional wall motion are within normal limits.  The ejection fraction is estimated to be 50-55%.  Tissue Doppler interrogation is unremarkable. Global longitudinal strain is marginally reduced (-15%).    Left Atrium: Normal chamber size and function.    Right Ventricle: Normal chamber size and morphology without wall thickening or systolic wall motion abnormalities.    Right Atrium: Normal chamber size and function.    Pericardium: No pericardial thickening, evidence of effusion or tamponade.     IVC: Normal size with normal respiratory variation.    IMPRESSION:    1. Low normal left ventricular global systolic function with an ejection fraction estimated to be 50-55%  2. Marginally decreased global longitudinal strain consistent with the aforementioned low normal left ventricular global systolic function.

## 2024-09-21 NOTE — PATIENT PROFILE ADULT - FALL HARM RISK - UNIVERSAL INTERVENTIONS
Bed in lowest position, wheels locked, appropriate side rails in place/Call bell, personal items and telephone in reach/Instruct patient to call for assistance before getting out of bed or chair/Non-slip footwear when patient is out of bed/[place] to call system/Physically safe environment - no spills, clutter or unnecessary equipment/Purposeful Proactive Rounding/Room/bathroom lighting operational, light cord in reach

## 2024-09-21 NOTE — ED PROVIDER NOTE - CLINICAL SUMMARY MEDICAL DECISION MAKING FREE TEXT BOX
29 y/o m hx cholelithiasis presents c/o worsening RUQ pain after eating tonight.  Pt afebrile in ED, tender to RUQ on exam, given morphine/iv tylenol with improvement of pain but not resolved.  Labs unremarkable, RUQ u/s shows cholelithiasis with no evidence of cholecystitis.  Surgery consulted, will f/u recs.

## 2024-10-02 LAB — SURGICAL PATHOLOGY STUDY: SIGNIFICANT CHANGE UP

## 2024-10-03 DIAGNOSIS — K66.0 PERITONEAL ADHESIONS (POSTPROCEDURAL) (POSTINFECTION): ICD-10-CM

## 2024-10-03 DIAGNOSIS — K80.20 CALCULUS OF GALLBLADDER WITHOUT CHOLECYSTITIS WITHOUT OBSTRUCTION: ICD-10-CM

## 2024-10-03 DIAGNOSIS — K81.0 ACUTE CHOLECYSTITIS: ICD-10-CM

## (undated) DEVICE — SUT VICRYL 0 27" UR-6

## (undated) DEVICE — ENDOCATCH 10MM SPECIMEN POUCH

## (undated) DEVICE — Device

## (undated) DEVICE — ELCTR CORD FOOTSWITCH 1PLR LAPSCP 10FT

## (undated) DEVICE — TROCAR COVIDIEN VERSAPORT BLADELESS OPTICAL 12MM STANDARD

## (undated) DEVICE — TUBING STRYKER PNEUMOSURE HI FLOW INSUFFLATOR

## (undated) DEVICE — TROCAR COVIDIEN VERSAONE BLUNT TIP HASSAN 12MM

## (undated) DEVICE — D HELP - CLEARVIEW CLEARIFY SYSTEM

## (undated) DEVICE — CLIPPER BLADE GENERAL USE

## (undated) DEVICE — TIP METZENBAUM SCISSOR MONOPOLAR ENDOCUT (ORANGE)

## (undated) DEVICE — GLV 8 PROTEXIS (WHITE)

## (undated) DEVICE — VENODYNE/SCD SLEEVE CALF MEDIUM

## (undated) DEVICE — TROCAR COVIDIEN VERSAONE FIXATION CANNULA 5MM

## (undated) DEVICE — PACK GENERAL LAPAROSCOPY

## (undated) DEVICE — WARMING BLANKET UPPER ADULT

## (undated) DEVICE — SOL IRR BAG NS 0.9% 3000ML

## (undated) DEVICE — SUT MONOCRYL 4-0 18" PS-2

## (undated) DEVICE — POSITIONER FOAM EGG CRATE ULNAR 2PCS (PINK)

## (undated) DEVICE — LIGASURE MARYLAND 37CM

## (undated) DEVICE — TROCAR COVIDIEN VERSAPORT BLADELESS OPTICAL 5MM STANDARD

## (undated) DEVICE — DRSG DERMABOND 0.7ML